# Patient Record
(demographics unavailable — no encounter records)

---

## 2024-10-16 NOTE — ASSESSMENT
[FreeTextEntry1] : 84-year-old female with mixed disease both lymphedema and chronic venous insufficiency symptomatic reflux.  With the right lower extremity worse than the left patient has been compliant with medical grade compression stockings and states that they have improved her symptoms. Had a long discussion about the potential for venous closure and even endovenous procedures patient currently hesitant about this and would prefer to continue with conservative management Continue daily compression stockings 20 to 30 mmHg knee-high Patient is likely a good candidate for a lymphedema pump measured and referral placed Continue leg elevation Continue leg exercises She had 1 leg swell worse than the other get red or any other symptoms of pain patient encouraged to call immediately and go to the ED as this is a symptom of DVT and SVT Follow-up in 6 months

## 2025-02-13 NOTE — PROCEDURE
[FreeTextEntry1] : right great saphenous vein radiofrequency ablation [FreeTextEntry2] : venous insufficiency [FreeTextEntry3] : Indication: right lower extremity varicose veins with leg pain and leg swelling.  Venous insufficiency/ reflux.   Procedure: radiofrequency ablation of the right great saphenous vein.  Assistant: Charo Frazier PA-C  Ms. MILTON THOMPSNO is a 84 year old F with a history of right lower extremity varicose veins previously seen in the office.  Ultrasound examination demonstrated venous insufficiency. A trial of compression stockings, exercise, elevation, and pain medication was attempted without relief and definitive treatment with radiofrequency ablation was offered.   The patient has come for radiofrequency ablation treatment of the right great saphenous vein.  I have discussed the risks of the procedure at length with the patient. The risks discussed were inclusive of but not limited to infection, irritation at the site of infiltration of local anesthesia, possible numbness lower extremity and rare risk of deep venous thrombosis and pulmonary emboli. The patient agrees to proceed with the procedure.   The patient was escorted into the procedure room and a time out called.  The entire limb was prepped and draped in sterile fashion. The RF fiber was placed on the sterile field and connected by a sterile cable. Actuation, temperature, and impedance testing were performed to ensure that all components were connected and operating properly. The patient was placed on the procedure table and local anesthesia was instilled in the skin overlying the access site. Under ultrasound guidance, the vein was punctured with a micro puncture needle, using the anterior wall technique. A guide wire was now introduced through the needle, and the needle was then exchanged over the guide wire for a 7F sheath. The guide wire was removed, and the RF probe was then placed into the right great saphenous vein through the sheath and position confirmed using ultrasound guidance. After the RF probe position was verified by ultrasound, tumescent anesthesia consisting of normal saline, 1% lidocaine with 8.4% sodium bicarbonate was infiltrated, under ultrasound guidance, precisely into the perivenous compartment along the entire length of the vein until a halo of fluid was noted around the vein. After RF probe position was again confirmed with ultrasound imaging, RF energy was applied. The probe was gradually and carefully withdrawn at a rate of 6.5cm/20seconds.   6 cycles of RF performed using the 7 cm probe Total treatment time was 2:00 seconds. The total volume injected was 3:00 cc Treatment length was 21 cm and The probe is >3.5 cm from the SFJ.   Estimated Blood Loss: minimal   Repeat ultrasound of the treated vein was performed confirming successful treatment. The catheter and sheath were withdrawn, and hemostasis established with direct pressure. After assuring hemostasis, a sterile 4x4 was placed on the access site and an ACE compression wrap was applied. Patient tolerated procedure well. Patient was given post-procedure instructions and follow up appointment was scheduled.

## 2025-02-13 NOTE — ASSESSMENT
[TextEntry] : Ms. MILTON THOMPSON is an 84 year old here for right great saphenous vein radiofrequency ablation

## 2025-02-27 NOTE — REASON FOR VISIT
[Procedure: _________] : a [unfilled] procedure visit [FreeTextEntry1] : right small saphenous vein radiofrequency ablation

## 2025-02-27 NOTE — PROCEDURE
[FreeTextEntry1] : right small saphenous vein radiofrequency ablation [FreeTextEntry2] : venous insufficiency [FreeTextEntry3] : Indication: right lower extremity varicose veins with leg pain and leg swelling.  Venous insufficiency/ reflux.   Procedure: radiofrequency ablation of the right small saphenous vein.   Assistant: Charo Frazier PA-C  Ms. MILTON THOMPSON is a 84 year old F with a history of right lower extremity varicose veins previously seen in the office.  Ultrasound examination demonstrated venous insufficiency. A trial of compression stockings, exercise, elevation, and pain medication was attempted without relief and definitive treatment with radiofrequency ablation was offered.   The patient has come for radiofrequency ablation treatment of the right small saphenous vein.  I have discussed the risks of the procedure at length with the patient. The risks discussed were inclusive of but not limited to infection, irritation at the site of infiltration of local anesthesia, possible numbness lower extremity and rare risk of deep venous thrombosis and pulmonary emboli. The patient agrees to proceed with the procedure.   The patient was escorted into the procedure room and a time out called.  The entire limb was prepped and draped in sterile fashion. The RF fiber was placed on the sterile field and connected by a sterile cable. Actuation, temperature, and impedance testing were performed to ensure that all components were connected and operating properly. The patient was placed on the procedure table and local anesthesia was instilled in the skin overlying the access site. Under ultrasound guidance, the vein was punctured with a micro puncture needle, using the anterior wall technique. A guide wire was now introduced through the needle, and the needle was then exchanged over the guide wire for a 7F sheath. The guide wire was removed, and the RF probe was then placed into the right small saphenous vein through the sheath and position confirmed using ultrasound guidance. After the RF probe position was verified by ultrasound, tumescent anesthesia consisting of normal saline, 1% lidocaine with 8.4% sodium bicarbonate was infiltrated, under ultrasound guidance, precisely into the perivenous compartment along the entire length of the vein until a halo of fluid was noted around the vein. After RF probe position was again confirmed with ultrasound imaging, RF energy was applied. The probe was gradually and carefully withdrawn at a rate of 6.5cm/20seconds.   8 cycles of RF performed using the 7 cm probe Total treatment time was 2:40 seconds. The total volume injected was 250 cc Treatment length was 20 cm and The probe is >4 cm from the SPJ.   Estimated Blood Loss: minimal   Repeat ultrasound of the treated vein was performed confirming successful treatment. The catheter and sheath were withdrawn, and hemostasis established with direct pressure. After assuring hemostasis, a sterile 4x4 was placed on the access site and an ACE compression wrap was applied. Patient tolerated procedure well. Patient was given post-procedure instructions and follow up appointment was scheduled.

## 2025-02-27 NOTE — ASSESSMENT
[TextEntry] : Ms. MILTON THOMPSON is an 84 year old with here right small saphenous vein radiofrequency ablation -Bleeding from circumcision site (boy babies only)

## 2025-03-13 NOTE — ASSESSMENT
[TextEntry] : Ms. MILTON THOMPSON is an 84-year-old here for left great saphenous vein radiofrequency ablation

## 2025-03-13 NOTE — PROCEDURE
[FreeTextEntry1] : left great saphenous vein radiofrequency ablation [FreeTextEntry2] : venous insufficiency [FreeTextEntry3] : Indication: left lower extremity varicose veins with leg pain and leg swelling.  Venous insufficiency/ reflux.   Procedure: radiofrequency ablation of the left saphenous vein.  Assistant: Charo Frazier PA-C  Ms. MILTON THOMPSON is a 84 year old F with a history of left lower extremity varicose veins previously seen in the office.  Ultrasound examination demonstrated venous insufficiency. A trial of compression stockings, exercise, elevation, and pain medication was attempted without relief and definitive treatment with radiofrequency ablation was offered.   The patient has come for radiofrequency ablation treatment of the left great saphenous vein.  I have discussed the risks of the procedure at length with the patient. The risks discussed were inclusive of but not limited to infection, irritation at the site of infiltration of local anesthesia, possible numbness lower extremity and rare risk of deep venous thrombosis and pulmonary emboli. The patient agrees to proceed with the procedure.   The patient was escorted into the procedure room and a time out called.  The entire limb was prepped and draped in sterile fashion. The RF fiber was placed on the sterile field and connected by a sterile cable. Actuation, temperature, and impedance testing were performed to ensure that all components were connected and operating properly. The patient was placed on the procedure table and local anesthesia was instilled in the skin overlying the access site. Under ultrasound guidance, the vein was punctured with a micro puncture needle, using the anterior wall technique. A guide wire was now introduced through the needle, and the needle was then exchanged over the guide wire for a 7F sheath. The guide wire was removed, and the RF probe was then placed into the left great saphenous vein through the sheath and position confirmed using ultrasound guidance. After the RF probe position was verified by ultrasound, tumescent anesthesia consisting of normal saline, 1% lidocaine with 8.4% sodium bicarbonate was infiltrated, under ultrasound guidance, precisely into the perivenous compartment along the entire length of the vein until a halo of fluid was noted around the vein. After RF probe position was again confirmed with ultrasound imaging, RF energy was applied. The probe was gradually and carefully withdrawn at a rate of 6.5cm/20seconds.   10 cycles of RF performed using the 7 cm probe Total treatment time was 3:20 seconds. The total volume injected was 300 cc Treatment length was 35 cm and The probe is 3.5 cm from the SFJ.   Estimated Blood Loss: minimal   Repeat ultrasound of the treated vein was performed confirming successful treatment. The catheter and sheath were withdrawn, and hemostasis established with direct pressure. After assuring hemostasis, a sterile 4x4 was placed on the access site and an ACE compression wrap was applied. Patient tolerated procedure well. Patient was given post-procedure instructions and follow up appointment was scheduled.

## 2025-03-24 NOTE — HISTORY OF PRESENT ILLNESS
Notify pt, nl mammo. [FreeTextEntry1] : Ms. MILTON THOMPSON is a 83 year with persistent bilateral lower extremity venous insufficiency, CEAP classification C-3 which is unresponsive to, leg elevation, exercise and over the counter pain medication_(Ibuprofen). Patient has complaints of worsening leg discomfort with swelling, fatigue, heaviness, achiness, cramping, and painful varicosities. The patients symptoms interfere with their ADLs, such as their ability to work and exercise__. Patient has intact pulses in both legs without evidence of arterial insufficiency.  Going to do a trial of compression therapy (20-30mmHg knee high stockings) explained proper wear and use with patient. Discussed fidnings of in office duplex  Patient is a candidate for endovenous ablation treatment of the b/l (right then left) GSV and right SSV The risks, benefits and alternatives treatment versus continued conservative management were discussed with the patient. Patient chooses endovenous ablation __treatments. Treatment plan to be scheduled.   Interval History: Since last visit patient has been doing well no major changes she is continuing to be compliant with her compression stockings as much as she can. She still has the same symptoms that she had previously that slightly interfere with her ADLs she has bilateral varicose veins that are still achy and bothering her. [de-identified] : Presenting today s/p L GSV RFA; states that her leg swelling (R > L) has not improved since procedural intervention. She does endorse improvements in her leg heaviness and has been wearing compression stockings.

## 2025-03-24 NOTE — ASSESSMENT
[FreeTextEntry1] : 85yo female  with lower extremity edema which is multifactorial at least partially secondary to chronic venous insufficiency now s/p RFA b/l legs with minimal improvement but no worsening; also likely secondary to lymphedema had a long discussion about management options including compression and lymphedema pumps patient at this point will continue with only compression -20-30mmHg compression stockings to be worn daily; prescription given and explained proper wear and use -calf muscle exercises and leg elevation when possible -Monitor for unilateral swelling, redness and pain if any of these symptoms occur recommend calling office immediately as these are signs of DVT/SVT

## 2025-06-04 NOTE — HISTORY OF PRESENT ILLNESS
[FreeTextEntry1] : Ms. MILTON THOMPSON is a 83 year with persistent bilateral lower extremity venous insufficiency, CEAP classification C-3 which is unresponsive to, leg elevation, exercise and over the counter pain medication_(Ibuprofen). Patient has complaints of worsening leg discomfort with swelling, fatigue, heaviness, achiness, cramping, and painful varicosities. The patients symptoms interfere with their ADLs, such as their ability to work and exercise__. Patient has intact pulses in both legs without evidence of arterial insufficiency.  Going to do a trial of compression therapy (20-30mmHg knee high stockings) explained proper wear and use with patient. Discussed fidnings of in office duplex  Patient is a candidate for endovenous ablation treatment of the b/l (right then left) GSV and right SSV The risks, benefits and alternatives treatment versus continued conservative management were discussed with the patient. Patient chooses endovenous ablation __treatments. Treatment plan to be scheduled.  Interval History: Since last visit patient has been doing well no major changes she is continuing to be compliant with her compression stockings as much as she can. She still has the same symptoms that she had previously that slightly interfere with her ADLs she has bilateral varicose veins that are still achy and bothering her.  Interval History: Presenting today s/p L GSV RFA; states that her leg swelling (R > L) has not improved since procedural intervention. She does endorse improvements in her leg heaviness and has been wearing compression stockings.   [de-identified] : Presenting today to follow up for lymphedema (s/p bilateral intervention for CVI);

## 2025-06-04 NOTE — HISTORY OF PRESENT ILLNESS
[FreeTextEntry1] : Ms. MILTON THOMPSON is a 83 year with persistent bilateral lower extremity venous insufficiency, CEAP classification C-3 which is unresponsive to, leg elevation, exercise and over the counter pain medication_(Ibuprofen). Patient has complaints of worsening leg discomfort with swelling, fatigue, heaviness, achiness, cramping, and painful varicosities. The patients symptoms interfere with their ADLs, such as their ability to work and exercise__. Patient has intact pulses in both legs without evidence of arterial insufficiency.  Going to do a trial of compression therapy (20-30mmHg knee high stockings) explained proper wear and use with patient. Discussed fidnings of in office duplex  Patient is a candidate for endovenous ablation treatment of the b/l (right then left) GSV and right SSV The risks, benefits and alternatives treatment versus continued conservative management were discussed with the patient. Patient chooses endovenous ablation __treatments. Treatment plan to be scheduled.  Interval History: Since last visit patient has been doing well no major changes she is continuing to be compliant with her compression stockings as much as she can. She still has the same symptoms that she had previously that slightly interfere with her ADLs she has bilateral varicose veins that are still achy and bothering her.  Interval History: Presenting today s/p L GSV RFA; states that her leg swelling (R > L) has not improved since procedural intervention. She does endorse improvements in her leg heaviness and has been wearing compression stockings.   [de-identified] : Presenting today to follow up for lymphedema (s/p bilateral intervention for CVI); no

## 2025-07-17 NOTE — PHYSICAL EXAM
[Alert] : alert [Oriented x 3] : ~L oriented x 3 [Well Nourished] : well nourished [Full Body Skin Exam Performed] : performed [FreeTextEntry3] : A full skin exam was performed including the scalp, face, neck, chest, abdomen, back, buttocks, upper extremities and lower extremities.  The patient declined examination of the breasts and genitalia.   The exam did show the following benign growths: [place] pigmented nevi. Seborrheic keratoses. Cherry angioma.  Erythema, scale, left NLF.  Brawny and pitting edema, marked on the right > left leg.

## 2025-07-17 NOTE — HISTORY OF PRESENT ILLNESS
[FreeTextEntry1] : Patient presents for skin examination.  [de-identified] : Denies new, changing, bleeding or tender lesions on the skin over the past year.

## 2025-07-17 NOTE — ASSESSMENT
[FreeTextEntry1] : A complete skin examination was performed.  There is no evidence of skin cancer.  We discussed the importance of photoprotection, including the use of hats, protective clothing and sunscreens with an SPF of at least 30.  Sun avoidance was also discussed.  The ABCDE's of melanoma was discussed.  Regular skin exams recommended.  Seb derm hytone cream bid prn.  Venous stasis She uses a pump at home. Discussed. Leg elevation. emollients regularly.